# Patient Record
Sex: MALE | Race: WHITE | Employment: FULL TIME | URBAN - METROPOLITAN AREA
[De-identification: names, ages, dates, MRNs, and addresses within clinical notes are randomized per-mention and may not be internally consistent; named-entity substitution may affect disease eponyms.]

---

## 2021-04-08 ENCOUNTER — APPOINTMENT (OUTPATIENT)
Dept: CT IMAGING | Age: 36
DRG: 074 | End: 2021-04-08
Payer: COMMERCIAL

## 2021-04-08 ENCOUNTER — HOSPITAL ENCOUNTER (INPATIENT)
Age: 36
LOS: 1 days | Discharge: HOME | DRG: 074 | End: 2021-04-09
Attending: EMERGENCY MEDICINE | Admitting: INTERNAL MEDICINE
Payer: COMMERCIAL

## 2021-04-08 ENCOUNTER — APPOINTMENT (OUTPATIENT)
Dept: GENERAL RADIOLOGY | Age: 36
DRG: 074 | End: 2021-04-08
Payer: COMMERCIAL

## 2021-04-08 DIAGNOSIS — R29.810 FACIAL WEAKNESS: Primary | ICD-10-CM

## 2021-04-08 PROBLEM — G45.9 TIA (TRANSIENT ISCHEMIC ATTACK): Status: ACTIVE | Noted: 2021-04-08

## 2021-04-08 LAB
ALBUMIN SERPL-MCNC: 5 G/DL (ref 3.5–5.2)
ALP BLD-CCNC: 66 U/L (ref 40–129)
ALT SERPL-CCNC: 153 U/L (ref 0–40)
ANION GAP SERPL CALCULATED.3IONS-SCNC: 11 MMOL/L (ref 7–16)
APTT: 31.8 SEC (ref 24.5–35.1)
AST SERPL-CCNC: 69 U/L (ref 0–39)
BASOPHILS ABSOLUTE: 0.07 E9/L (ref 0–0.2)
BASOPHILS RELATIVE PERCENT: 0.8 % (ref 0–2)
BILIRUB SERPL-MCNC: 0.3 MG/DL (ref 0–1.2)
BUN BLDV-MCNC: 14 MG/DL (ref 6–20)
CALCIUM SERPL-MCNC: 9.9 MG/DL (ref 8.6–10.2)
CHLORIDE BLD-SCNC: 100 MMOL/L (ref 98–107)
CHP ED QC CHECK: YES
CO2: 27 MMOL/L (ref 22–29)
CREAT SERPL-MCNC: 1.2 MG/DL (ref 0.7–1.2)
EOSINOPHILS ABSOLUTE: 0.28 E9/L (ref 0.05–0.5)
EOSINOPHILS RELATIVE PERCENT: 3.2 % (ref 0–6)
GFR AFRICAN AMERICAN: >60
GFR NON-AFRICAN AMERICAN: >60 ML/MIN/1.73
GLUCOSE BLD-MCNC: 101 MG/DL (ref 74–99)
GLUCOSE BLD-MCNC: 99 MG/DL
HCT VFR BLD CALC: 43.4 % (ref 37–54)
HEMOGLOBIN: 15 G/DL (ref 12.5–16.5)
IMMATURE GRANULOCYTES #: 0.02 E9/L
IMMATURE GRANULOCYTES %: 0.2 % (ref 0–5)
INR BLD: 1
LYMPHOCYTES ABSOLUTE: 3.32 E9/L (ref 1.5–4)
LYMPHOCYTES RELATIVE PERCENT: 38 % (ref 20–42)
MCH RBC QN AUTO: 29.8 PG (ref 26–35)
MCHC RBC AUTO-ENTMCNC: 34.6 % (ref 32–34.5)
MCV RBC AUTO: 86.3 FL (ref 80–99.9)
METER GLUCOSE: 99 MG/DL (ref 74–99)
MONOCYTES ABSOLUTE: 0.96 E9/L (ref 0.1–0.95)
MONOCYTES RELATIVE PERCENT: 11 % (ref 2–12)
NEUTROPHILS ABSOLUTE: 4.08 E9/L (ref 1.8–7.3)
NEUTROPHILS RELATIVE PERCENT: 46.8 % (ref 43–80)
PDW BLD-RTO: 12.8 FL (ref 11.5–15)
PLATELET # BLD: 336 E9/L (ref 130–450)
PMV BLD AUTO: 9.2 FL (ref 7–12)
POTASSIUM SERPL-SCNC: 3.6 MMOL/L (ref 3.5–5)
PROTHROMBIN TIME: 11 SEC (ref 9.3–12.4)
RBC # BLD: 5.03 E12/L (ref 3.8–5.8)
SODIUM BLD-SCNC: 138 MMOL/L (ref 132–146)
TOTAL PROTEIN: 8.5 G/DL (ref 6.4–8.3)
TROPONIN: <0.01 NG/ML (ref 0–0.03)
WBC # BLD: 8.7 E9/L (ref 4.5–11.5)

## 2021-04-08 PROCEDURE — 71046 X-RAY EXAM CHEST 2 VIEWS: CPT

## 2021-04-08 PROCEDURE — 6370000000 HC RX 637 (ALT 250 FOR IP): Performed by: EMERGENCY MEDICINE

## 2021-04-08 PROCEDURE — 1200000000 HC SEMI PRIVATE

## 2021-04-08 PROCEDURE — 85610 PROTHROMBIN TIME: CPT

## 2021-04-08 PROCEDURE — 80053 COMPREHEN METABOLIC PANEL: CPT

## 2021-04-08 PROCEDURE — 99285 EMERGENCY DEPT VISIT HI MDM: CPT

## 2021-04-08 PROCEDURE — 84484 ASSAY OF TROPONIN QUANT: CPT

## 2021-04-08 PROCEDURE — 36415 COLL VENOUS BLD VENIPUNCTURE: CPT

## 2021-04-08 PROCEDURE — 93005 ELECTROCARDIOGRAM TRACING: CPT | Performed by: EMERGENCY MEDICINE

## 2021-04-08 PROCEDURE — 85730 THROMBOPLASTIN TIME PARTIAL: CPT

## 2021-04-08 PROCEDURE — 82962 GLUCOSE BLOOD TEST: CPT

## 2021-04-08 PROCEDURE — 70450 CT HEAD/BRAIN W/O DYE: CPT

## 2021-04-08 PROCEDURE — 85025 COMPLETE CBC W/AUTO DIFF WBC: CPT

## 2021-04-08 RX ORDER — ACETAMINOPHEN 500 MG
1000 TABLET ORAL ONCE
Status: COMPLETED | OUTPATIENT
Start: 2021-04-08 | End: 2021-04-08

## 2021-04-08 RX ADMIN — ACETAMINOPHEN 1000 MG: 500 TABLET, FILM COATED ORAL at 21:56

## 2021-04-08 ASSESSMENT — PAIN SCALES - GENERAL: PAINLEVEL_OUTOF10: 1

## 2021-04-09 ENCOUNTER — APPOINTMENT (OUTPATIENT)
Dept: MRI IMAGING | Age: 36
DRG: 074 | End: 2021-04-09
Payer: COMMERCIAL

## 2021-04-09 VITALS
RESPIRATION RATE: 16 BRPM | WEIGHT: 237 LBS | OXYGEN SATURATION: 97 % | TEMPERATURE: 97.7 F | SYSTOLIC BLOOD PRESSURE: 130 MMHG | BODY MASS INDEX: 33.93 KG/M2 | DIASTOLIC BLOOD PRESSURE: 83 MMHG | HEIGHT: 70 IN | HEART RATE: 74 BPM

## 2021-04-09 LAB
EKG ATRIAL RATE: 61 BPM
EKG ATRIAL RATE: 90 BPM
EKG P AXIS: 54 DEGREES
EKG P AXIS: 58 DEGREES
EKG P-R INTERVAL: 162 MS
EKG P-R INTERVAL: 172 MS
EKG Q-T INTERVAL: 348 MS
EKG Q-T INTERVAL: 390 MS
EKG QRS DURATION: 82 MS
EKG QRS DURATION: 92 MS
EKG QTC CALCULATION (BAZETT): 392 MS
EKG QTC CALCULATION (BAZETT): 425 MS
EKG R AXIS: 24 DEGREES
EKG R AXIS: 26 DEGREES
EKG T AXIS: 49 DEGREES
EKG T AXIS: 50 DEGREES
EKG VENTRICULAR RATE: 61 BPM
EKG VENTRICULAR RATE: 90 BPM
LV EF: 60 %
LVEF MODALITY: NORMAL
TROPONIN: <0.01 NG/ML (ref 0–0.03)
TROPONIN: <0.01 NG/ML (ref 0–0.03)

## 2021-04-09 PROCEDURE — 70551 MRI BRAIN STEM W/O DYE: CPT

## 2021-04-09 PROCEDURE — 2580000003 HC RX 258: Performed by: INTERNAL MEDICINE

## 2021-04-09 PROCEDURE — 93306 TTE W/DOPPLER COMPLETE: CPT

## 2021-04-09 PROCEDURE — 93005 ELECTROCARDIOGRAM TRACING: CPT | Performed by: INTERNAL MEDICINE

## 2021-04-09 PROCEDURE — 92523 SPEECH SOUND LANG COMPREHEN: CPT

## 2021-04-09 PROCEDURE — 6370000000 HC RX 637 (ALT 250 FOR IP): Performed by: INTERNAL MEDICINE

## 2021-04-09 PROCEDURE — 36415 COLL VENOUS BLD VENIPUNCTURE: CPT

## 2021-04-09 PROCEDURE — 99221 1ST HOSP IP/OBS SF/LOW 40: CPT | Performed by: PSYCHIATRY & NEUROLOGY

## 2021-04-09 PROCEDURE — 97161 PT EVAL LOW COMPLEX 20 MIN: CPT

## 2021-04-09 PROCEDURE — 97165 OT EVAL LOW COMPLEX 30 MIN: CPT

## 2021-04-09 PROCEDURE — 92610 EVALUATE SWALLOWING FUNCTION: CPT

## 2021-04-09 PROCEDURE — 6360000002 HC RX W HCPCS: Performed by: INTERNAL MEDICINE

## 2021-04-09 PROCEDURE — 84484 ASSAY OF TROPONIN QUANT: CPT

## 2021-04-09 RX ORDER — ONDANSETRON 2 MG/ML
4 INJECTION INTRAMUSCULAR; INTRAVENOUS EVERY 6 HOURS PRN
Status: DISCONTINUED | OUTPATIENT
Start: 2021-04-09 | End: 2021-04-09 | Stop reason: HOSPADM

## 2021-04-09 RX ORDER — ASPIRIN 81 MG/1
81 TABLET, CHEWABLE ORAL DAILY
Status: DISCONTINUED | OUTPATIENT
Start: 2021-04-09 | End: 2021-04-09 | Stop reason: HOSPADM

## 2021-04-09 RX ORDER — ATORVASTATIN CALCIUM 80 MG/1
80 TABLET, FILM COATED ORAL NIGHTLY
Status: DISCONTINUED | OUTPATIENT
Start: 2021-04-09 | End: 2021-04-09 | Stop reason: HOSPADM

## 2021-04-09 RX ORDER — SODIUM CHLORIDE 9 MG/ML
25 INJECTION, SOLUTION INTRAVENOUS PRN
Status: DISCONTINUED | OUTPATIENT
Start: 2021-04-09 | End: 2021-04-09 | Stop reason: HOSPADM

## 2021-04-09 RX ORDER — PROMETHAZINE HYDROCHLORIDE 25 MG/1
12.5 TABLET ORAL EVERY 6 HOURS PRN
Status: DISCONTINUED | OUTPATIENT
Start: 2021-04-09 | End: 2021-04-09 | Stop reason: HOSPADM

## 2021-04-09 RX ORDER — SODIUM CHLORIDE 0.9 % (FLUSH) 0.9 %
5-40 SYRINGE (ML) INJECTION EVERY 12 HOURS SCHEDULED
Status: DISCONTINUED | OUTPATIENT
Start: 2021-04-09 | End: 2021-04-09 | Stop reason: HOSPADM

## 2021-04-09 RX ORDER — PREDNISONE 20 MG/1
60 TABLET ORAL DAILY
Qty: 10 TABLET | Refills: 0 | Status: SHIPPED | OUTPATIENT
Start: 2021-04-09 | End: 2021-04-16

## 2021-04-09 RX ORDER — ASPIRIN 300 MG/1
300 SUPPOSITORY RECTAL DAILY
Status: DISCONTINUED | OUTPATIENT
Start: 2021-04-09 | End: 2021-04-09

## 2021-04-09 RX ORDER — POLYETHYLENE GLYCOL 3350 17 G/17G
17 POWDER, FOR SOLUTION ORAL DAILY PRN
Status: DISCONTINUED | OUTPATIENT
Start: 2021-04-09 | End: 2021-04-09 | Stop reason: HOSPADM

## 2021-04-09 RX ORDER — SODIUM CHLORIDE 0.9 % (FLUSH) 0.9 %
5-40 SYRINGE (ML) INJECTION PRN
Status: DISCONTINUED | OUTPATIENT
Start: 2021-04-09 | End: 2021-04-09 | Stop reason: HOSPADM

## 2021-04-09 RX ORDER — VALACYCLOVIR HYDROCHLORIDE 1 G/1
1000 TABLET, FILM COATED ORAL 3 TIMES DAILY
Qty: 21 TABLET | Refills: 0 | Status: SHIPPED | OUTPATIENT
Start: 2021-04-09 | End: 2021-04-16

## 2021-04-09 RX ADMIN — ASPIRIN 81 MG CHEWABLE TABLET 81 MG: 81 TABLET CHEWABLE at 11:11

## 2021-04-09 RX ADMIN — SODIUM CHLORIDE, PRESERVATIVE FREE 10 ML: 5 INJECTION INTRAVENOUS at 09:28

## 2021-04-09 RX ADMIN — ENOXAPARIN SODIUM 40 MG: 40 INJECTION SUBCUTANEOUS at 09:28

## 2021-04-09 ASSESSMENT — PAIN SCALES - GENERAL
PAINLEVEL_OUTOF10: 0

## 2021-04-09 NOTE — H&P
software. Every effort was made to ensure accuracy; however, inadvertent computerized transcription errors may be present.

## 2021-04-09 NOTE — PROGRESS NOTES
SPEECH/LANGUAGE PATHOLOGY  SPEECH/LANGUAGE/COGNITIVE EVALUATION      PATIENT NAME:  Samantha Osorio      :  1985          TODAY'S DATE:  2021 ROOM:  [address] [place] [address]       ADMITTING DIAGNOSIS: TIA (transient ischemic attack) [G45.9]    SPEECH PATHOLOGY DIAGNOSIS:    Mild dysarthria     THERAPY RECOMMENDATIONS:       Speech Pathology intervention is recommended 3-6 times per week for LOS or when goals are met with emphasis on the following: To Improve oral motor strength and ROM               MOTOR SPEECH       Oral Peripheral Examination   Right labiobuccal weakness    Parameters of Speech Production  Respiration:  Adequate for speech production  Articulation:  Mild Distortion  Resonance:  Within functional limits  Quality:   Within functional limits  Pitch: Within functional limits  Intensity: Within functional limits  Fluency:  Intact  Prosody Intact    RECEPTIVE LANGUAGE    Comprehension of Yes/No Questions: Within functional limits    Process  Simple Verbal Commands:   Within functional limits  Process Intermediate Verbal Commands:   Within functional limits  Process Complex Verbal Commands:     Within functional limits    Comprehension of Conversation:      Within functional limits      EXPRESSIVE LANGUAGE     Serials: Functional    Imitation:  Words   Functional   Sentences Functional    Naming:  (Modality used:  Verbal)  Confrontation Naming  Functional  Functional Description  Functional  Response Naming: Functional    Conversation:      Conversation was within functional limits    COGNITION     Attention/Orientation  Attention: Sustained attention   Orientation:  Oriented to Person, Place, Date, Reason for hospitalization    Memory   Immediate Recall: Repeated 3/3    Delayed Recall:   Recalled 3/3    Long Term Recall:   Recalled Address, Birthdate, Age and Family    Organization/Problem Solving/Reasoning   Verbal Sequencing: To be assessed        Verbal Problem solving:    To be assessed CLINICAL OBSERVATIONS NOTED DURING THE EVALUATION  Within functional limits                  Prognosis for improvements is good  This plan will be re-evaluated and revised in 1 week  if warranted. Patient stated goals: Agreed with above  Treatment goals discussed with Patient   The Patient understand(s) the diagnosis, prognosis and plan of care       CPT code:    68481  eval speech sound lang comprehension        The admitting diagnosis and active problem list, as listed below have been reviewed prior to initiation of this evaluation.         ACTIVE PROBLEM LIST:   Patient Active Problem List   Diagnosis    Infective otitis externa    Cellulitis of head except face    TIA (transient ischemic attack)       Princess Berger   SLP student intern

## 2021-04-09 NOTE — CONSULTS
Georgie Gale is a 30 y.o. male       Chief Complaint   Patient presents with    Facial Droop     HA behind right eye started at 12 pm.  tongue feels like there's something on it.  ++ left mothe droop. c/o both hands being numb       HPI:    73-year-old man with out significant medical history presents with right-sided facial droop. He has had some associated symptoms of abnormal sensation in the tongue and the lips. He reported always mild headache but not to me. He has never had similar symptoms before. No tick bite or arthritis. HPI  Prior to Visit Medications    Not on File     Social History     Tobacco Use    Smoking status: Never Smoker    Smokeless tobacco: Never Used   Substance Use Topics    Alcohol use: No    Drug use: No     No family history on file. Past Surgical History:   Procedure Laterality Date    WISDOM TOOTH EXTRACTION       No past medical history on file. Review of Systems    As stated above all others negative. Objective:   /67   Pulse 68   Temp 96.7 °F (35.9 °C) (Temporal)   Resp 12   Ht 5' 10\" (1.778 m)   Wt 237 lb (107.5 kg)   SpO2 98%   BMI 34.01 kg/m²     Physical Exam  HENT:      Head: Normocephalic and atraumatic. Mouth/Throat:      Mouth: Mucous membranes are moist.      Pharynx: Oropharynx is clear. Eyes:      General: Lids are normal.      Extraocular Movements: Extraocular movements intact. Conjunctiva/sclera: Conjunctivae normal.      Pupils: Pupils are equal, round, and reactive to light. Neck:      Musculoskeletal: Neck supple. Cardiovascular:      Rate and Rhythm: Normal rate and regular rhythm. Abdominal:      General: Bowel sounds are normal.      Palpations: Abdomen is soft. Skin:     General: Skin is warm. Neurological:      Mental Status: He is alert. Deep Tendon Reflexes: Strength normal.      Reflex Scores:       Tricep reflexes are 2+ on the right side and 2+ on the left side.        Bicep reflexes are 2+ on the right side and 2+ on the left side. Brachioradialis reflexes are 2+ on the right side and 2+ on the left side. Patellar reflexes are 2+ on the right side and 2+ on the left side. Achilles reflexes are 2+ on the right side and 2+ on the left side. Psychiatric:         Mood and Affect: Mood normal.         Speech: Speech normal.                 Neurological Exam  Mental Status  Alert. Oriented to person, place, time and situation. Recent and remote memory are intact. Speech is normal. Attention and concentration are normal.    Cranial Nerves  CN II: Visual fields full to confrontation. CN III, IV, VI: Extraocular movements intact bilaterally. Normal lids and orbits bilaterally. Pupils equal round and reactive to light bilaterally. CN V: Facial sensation is normal.  CN VII:  Right: There is central facial weakness. CN VIII: Hearing is normal.  CN IX, X: Palate elevates symmetrically. Normal gag reflex. CN XI: Shoulder shrug strength is normal.  CN XII: Tongue midline without atrophy or fasciculations. Motor  Normal muscle bulk throughout. Normal muscle tone. No abnormal involuntary movements. Strength is 5/5 throughout all four extremities. Sensory  Light touch is normal in upper and lower extremities. Pinprick is normal in upper and lower extremities.      Reflexes                                           Right                      Left  Brachioradialis                    2+                         2+  Biceps                                 2+                         2+  Triceps                                2+                         2+  Finger flex                           2+                         2+  Hamstring                            2+                         2+  Patellar                                2+                         2+  Achilles                                2+                         2+    Coordination  Right: Finger-to-nose normal. Heel-to-shin normal.  Left: Finger-to-nose normal. Heel-to-shin normal.      Laboratory/Radiology:     CBC with Differential:    Lab Results   Component Value Date    WBC 8.7 04/08/2021    RBC 5.03 04/08/2021    HGB 15.0 04/08/2021    HCT 43.4 04/08/2021     04/08/2021    MCV 86.3 04/08/2021    MCH 29.8 04/08/2021    MCHC 34.6 04/08/2021    RDW 12.8 04/08/2021    LYMPHOPCT 38.0 04/08/2021    MONOPCT 11.0 04/08/2021    BASOPCT 0.8 04/08/2021    MONOSABS 0.96 04/08/2021    LYMPHSABS 3.32 04/08/2021    EOSABS 0.28 04/08/2021    BASOSABS 0.07 04/08/2021     CMP:    Lab Results   Component Value Date     04/08/2021    K 3.6 04/08/2021     04/08/2021    CO2 27 04/08/2021    BUN 14 04/08/2021    CREATININE 1.2 04/08/2021    GFRAA >60 04/08/2021    LABGLOM >60 04/08/2021    GLUCOSE 99 04/08/2021    PROT 8.5 04/08/2021    LABALBU 5.0 04/08/2021    CALCIUM 9.9 04/08/2021    BILITOT 0.3 04/08/2021    ALKPHOS 66 04/08/2021    AST 69 04/08/2021     04/08/2021     Warfarin PT/INR:  No components found for: PTPATWAR, PTINRWAR  HgBA1c:  No results found for: LABA1C    MRI brain:  negative    I independently reviewed the labs and imaging studies at today's appointment. Assessment:     [place] palsy. Plan:     Valacyclovir 1000mg TIS for 1 week  Prednisone 60mg daily for 1 week.       Oriana Hernandez  11:33 AM  4/9/2021

## 2021-04-09 NOTE — CARE COORDINATION
Spoke with patient in room. He lives home with 2 children in a 1 floor apartment. He is independent at home and works full time. His PCP is Dr Nayeli Fortune, he states no history of HHC or PAMELA. Discussed transition of care, plan is home, no needs noted at this time.

## 2021-04-09 NOTE — DISCHARGE SUMMARY
Hospital Medicine Discharge Summary    Patient ID: Georgie Gale      Patient's PCP: Ruby Coulter DO    Admit Date: 4/8/2021     Discharge Date:    4/8/2021    Admitting Physician: Todd Davis DO     Discharge Physician: Amy Chávez MD      Condition at discharge: Stable    Disposition: Home    Discharge Diagnoses:  Bell's palsy  Incidental finding of PFO         The patient was seen and examined on day of discharge and this discharge summary is in conjunction with any daily progress note from day of discharge. Hospital Course:   Mr. Georgie Gale, a 30y.o. year old male  who  has no past medical history on file. Patient says that he is relatively healthy and has no chronic medical issues. Patient comes in with complaints of right-sided headache over the past couple of days. Yesterday he also noticed his tongue to be and test was abnormal.  He also noticed some droopiness on the left side of his mouth. CT head did not show any acute findings. Patient had MRI done which did not show any acute findings. Patient was seen by neurology and they thought patient most likely had Bell's palsy. He recommended valacyclovir and prednisone for now. Patient also had echocardiogram done which showed incidental findings of PFO. I saw the patient again and explained the findings to him. I offered him to stay in the hospital and be evaluated by cardiology for his PFO or follow-up as outpatient and patient at this time chose to follow-up with cardiology as outpatient. At this time the clinical significance is unclear given that he did not have any acute CVA. He was recommended to have a close follow-up with cardiology as outpatient. I did refer him to Dr. Quinn Allen. Consults:     IP CONSULT TO INTERNAL MEDICINE  IP CONSULT TO NEUROLOGY    Significant Diagnostic Studies:  As above      Discharge Instructions/Follow-up:  As above       Activity: activity as tolerated    Labs:  For convenience and continuity at follow-up the following most recent labs are provided:      CBC:    Lab Results   Component Value Date    WBC 8.7 04/08/2021    HGB 15.0 04/08/2021    HCT 43.4 04/08/2021     04/08/2021       Renal:    Lab Results   Component Value Date     04/08/2021    K 3.6 04/08/2021     04/08/2021    CO2 27 04/08/2021    BUN 14 04/08/2021    CREATININE 1.2 04/08/2021    CALCIUM 9.9 04/08/2021         Discharge Medications:     Current Discharge Medication List           Details   valACYclovir (VALTREX) 1 g tablet Take 1 tablet by mouth 3 times daily for 7 days  Qty: 21 tablet, Refills: 0      predniSONE (DELTASONE) 20 MG tablet Take 3 tablets by mouth daily for 7 days  Qty: 10 tablet, Refills: 0             Signed:    Loree Baum MD   4/9/2021      Thank you Dago Parker DO for the opportunity to be involved in this patient's care. If you have any questions or concerns please feel free to contact me. NOTE: This report was transcribed using voice recognition software. Every effort was made to ensure accuracy; however, inadvertent computerized transcription errors may be present.

## 2021-04-09 NOTE — PROGRESS NOTES
OCCUPATIONAL THERAPY INITIAL EVALUATION      Date:2021  Patient Name: Samantha Osorio  MRN: [id number]  : 1985  Room: [address] [place] [address]      [address], [facility] [id number]    AM-PAC Daily Activity Raw Score:   Recommended Adaptive Equipment: none     Diagnosis: TIA (transient ischemic attack) [G45.9]     Modified [place] Scale (MRS)  Score     Description  0             No symptoms  1             No significant disability despite symptoms  2             Slight disability; able to look after own affairs  3             Moderate disability; able to ambulate without assist/ requires assist with ADLs  4             Moderate/Severe disability;requires assist to ambulate/assist with ADLs  5             Severe disability;bedridden/incontinent   6               Score:   1    Referring physician: Bao Navarrete DO    Pertinent Medical History: No past medical history on file. Precautions:  NPO     Home Living: Pt lives with 2 sons in [address]st floor [address], 1 handrail   Bathroom setup: tub/shower   Equipment owned: n/a    Prior Level of Function: independent with ADLs , independent with IADLs; ambulated independently w/o AD  Driving: yes  Occupation: food     Pain Level: Pt c/o 1/10 headache this session    Cognition: A&O: /; Follows 1-2 step directions   Memory:  good (good recall)   Sequencing:  good    Problem solving:  good    Judgement/safety:  good      Functional Assessment:   Initial Eval Status  Date: 21 Treatment Status  Date: Short Term Goals/LTG  Treatment frequency: Evaluation Only   Feeding NPO  Once approved for diet,  reinforced importance of safety d/t c/o lip tingling     Grooming Independent      UB Dressing Independent      LB Dressing Modified [place]      Bathing Modified [place]     Toileting Independent      Bed Mobility  Rolling: Independent   Supine to sit:  Independent   Sit to supine: Independent      Functional Transfers Independent

## 2021-04-09 NOTE — ED PROVIDER NOTES
HPI:  4/8/21,   Time: 10:12 PM EDT         Genny Harrison is a 30 y.o. male presenting to the ED for left facial drooping, beginning more than 10 ago. The complaint has been persistent, moderate in severity, and worsened by nothing. Patient noted some numbness of the left side of his mouth actually started yesterday and then today when he brushes teeth and he spit the water out he states it went up to the right. He looked in the mirror and he had significant facial drooping on the left. He also has a headache in the right frontal temporal area. He has good strength in his arms and legs. ROS:   Pertinent positives and negatives are stated within HPI, all other systems reviewed and are negative.  --------------------------------------------- PAST HISTORY ---------------------------------------------  Past Medical History:  has no past medical history on file. Past Surgical History:  has a past surgical history that includes [place] tooth extraction. Social History:  reports that he has never smoked. He has never used smokeless tobacco. He reports that he does not drink alcohol or use drugs. Family History: family history is not on file. The patients home medications have been reviewed. Allergies: Patient has no known allergies.     -------------------------------------------------- RESULTS -------------------------------------------------  All laboratory and radiology results have been personally reviewed by myself   LABS:  Results for orders placed or performed during the hospital encounter of 04/08/21   CBC auto differential   Result Value Ref Range    WBC 8.7 4.5 - 11.5 E9/L    RBC 5.03 3.80 - 5.80 E12/L    Hemoglobin 15.0 12.5 - 16.5 g/dL    Hematocrit 43.4 37.0 - 54.0 %    MCV 86.3 80.0 - 99.9 fL    MCH 29.8 26.0 - 35.0 pg    MCHC 34.6 (H) 32.0 - 34.5 %    RDW 12.8 11.5 - 15.0 fL    Platelets 051 815 - 247 E9/L    MPV 9.2 7.0 - 12.0 fL    Neutrophils % 46.8 43.0 - 80.0 %    Immature Granulocytes % 0.2 0.0 - 5.0 %    Lymphocytes % 38.0 20.0 - 42.0 %    Monocytes % 11.0 2.0 - 12.0 %    Eosinophils % 3.2 0.0 - 6.0 %    Basophils % 0.8 0.0 - 2.0 %    Neutrophils Absolute 4.08 1.80 - 7.30 E9/L    Immature Granulocytes # 0.02 E9/L    Lymphocytes Absolute 3.32 1.50 - 4.00 E9/L    Monocytes Absolute 0.96 (H) 0.10 - 0.95 E9/L    Eosinophils Absolute 0.28 0.05 - 0.50 E9/L    Basophils Absolute 0.07 0.00 - 0.20 E9/L   Comprehensive metabolic panel   Result Value Ref Range    Sodium 138 132 - 146 mmol/L    Potassium 3.6 3.5 - 5.0 mmol/L    Chloride 100 98 - 107 mmol/L    CO2 27 22 - 29 mmol/L    Anion Gap 11 7 - 16 mmol/L    Glucose 101 (H) 74 - 99 mg/dL    BUN 14 6 - 20 mg/dL    CREATININE 1.2 0.7 - 1.2 mg/dL    GFR Non-African American >60 >=60 mL/min/1.73    GFR African American >60     Calcium 9.9 8.6 - 10.2 mg/dL    Total Protein 8.5 (H) 6.4 - 8.3 g/dL    Albumin 5.0 3.5 - 5.2 g/dL    Total Bilirubin 0.3 0.0 - 1.2 mg/dL    Alkaline Phosphatase 66 40 - 129 U/L     (H) 0 - 40 U/L    AST 69 (H) 0 - 39 U/L   Protime-INR   Result Value Ref Range    Protime 11.0 9.3 - 12.4 sec    INR 1.0    APTT   Result Value Ref Range    aPTT 31.8 24.5 - 35.1 sec   Troponin   Result Value Ref Range    Troponin <0.01 0.00 - 0.03 ng/mL   POCT glucose   Result Value Ref Range    Glucose 99 mg/dL    QC OK? yes    POCT Glucose   Result Value Ref Range    Meter Glucose 99 74 - 99 mg/dL       RADIOLOGY:  Interpreted by Radiologist.  XR CHEST (2 VW)   Final Result   No acute process. CT HEAD WO CONTRAST   Final Result   No acute intracranial abnormality.             ------------------------- NURSING NOTES AND VITALS REVIEWED ---------------------------   The nursing notes within the ED encounter and vital signs as below have been reviewed.    /64   Pulse 71   Temp 98.4 °F (36.9 °C) (Oral)   Resp 18   Ht 5' 10\" (1.778 m)   Wt 237 lb (107.5 kg)   SpO2 99%   BMI 34.01 kg/m²   Oxygen Saturation Interpretation: Normal      ---------------------------------------------------PHYSICAL EXAM--------------------------------------      Constitutional/General: Alert and oriented x3, well appearing, non toxic in NAD  Head: NC/AT  Eyes: PERRL, EOMI  Mouth: Oropharynx clear, handling secretions, no trismus  Neck: Supple, full ROM, no meningeal signs  Pulmonary: Lungs clear to auscultation bilaterally, no wheezes, rales, or rhonchi. Not in respiratory distress  Cardiovascular:  Regular rate and rhythm, no murmurs, gallops, or rubs. 2+ distal pulses  Abdomen: Soft, non tender, non distended,   Extremities: Moves all extremities x 4. Warm and well perfused  Skin: warm and dry without rash  Neurologic: GCS 15, has good strength in his arms and legs equal bilaterally. He has some significant left facial drooping involving the corner of his mouth on the left and left lower lip. Extraocular muscles are intact. Upon asked to wrinkle his forehead the forehead looks pretty symmetric which concerns me that this may not be a Bell's palsy. Psych: Normal Affect      ------------------------------ ED COURSE/MEDICAL DECISION MAKING----------------------  Medications   acetaminophen (TYLENOL) tablet 1,000 mg (1,000 mg Oral Given 4/8/21 2984)         Medical Decision Making:    Routine labs are normal.  CT of the head was negative. Consult was placed to sound physicians as patient needs to be admitted and evaluated by neurology and  needs MRI  .[facility]  Spoke to hospitalist at [facility] yarelis, case discussed she will accept the patient     NIH score is 1-2  Counseling: The emergency provider has spoken with the patient and discussed todays results, in addition to providing specific details for the plan of care and counseling regarding the diagnosis and prognosis.   Questions are answered at this time and they are agreeable with the plan.      --------------------------------- IMPRESSION AND DISPOSITION ---------------------------------    IMPRESSION  1.  Facial weakness        DISPOSITION  Disposition: Admit to telemetry  Patient condition is stable                  Isra Childers MD  04/10/21 [id number]

## 2021-04-09 NOTE — PROGRESS NOTES
Pt has bells palsy. He can be discharged with 1 week of steroids and antiviral therapy. Full consult note to follow.

## 2021-04-09 NOTE — PROGRESS NOTES
Physical Therapy  Physical Therapy Initial Assessment     Name: Veronica Tierney  : 1985  MRN: [id number]    Referring Provider: Jeanine Cook DO    Date of Service: 2021    Evaluating PT: Lucia Barlow, PT, DPT, [id number]    Room #: 3493/1201-J  Diagnosis: TIA    SUBJECTIVE:    Pt lives with 2 children (6 and 15 y/o) in a single story apartment unit. Ramp + 3 stairs and 1 rail to enter building. 10 stairs and 2 wide rails to apartment level. Pt ambulated without AD prior to admission. Pt works full time at a [employer] job. OBJECTIVE:   Initial Evaluation  Date: 21 Treatment Date: Short Term/ Long Term   Goals   AM-PAC 6 Clicks      Was pt agreeable to Eval/treatment? Yes     Does pt have pain? Mild posterior HA     Bed Mobility  Rolling: Independent   Supine to sit: Independent   Sit to supine: Independent   Scooting: Independent   NA   Transfers Sit to stand: Independent   Stand to sit: Independent   Stand pivot: Independent   NA   Ambulation   50 feet x2 Independent   NA   Stair negotiation: ascended and descended 4 step(s) with 1 rail(s) Mod Independent   NA   ROM BUE: Refer to OT note  BLE: WFL     Strength BUE: Refer to OT note  BLE: 5/5  NA   Balance Sitting EOB: Independent   Dynamic Standing: Independent   NA     Pt is A & O x: 4 to person, place, month/year, and situation. Sensation: Pt denies numbness and tingling of extremities. B LE light touch sensation intact with examination. Edema: Unremarkable. Patient education  Pt educated on PT role in acute care setting. Patient response to education:   Pt verbalized understanding Pt demonstrated skill Pt requires further education in this area   Yes NA No     ASSESSMENT:    Comments:   Pt was supine in bed with sister present upon room entry, agreeable to PT evaluation. Pt ambulates with normal gait speed with good steadiness. Pt did not have LOB. Pt negotiated stairs without difficulty.  Pt reports he is at baseline and has no PT needs at this time. Pt was left in bed with all needs met at conclusion of session. PLAN:    Based on pt's current level of functional independence for all mobility, this pt is not a candidate for continued skilled PT services. Will remove pt from PT caseload. Please re-consult if pt experiences functional decline. Thank you. Time in: 1000  Time out: 1015    Total Treatment Time 0 minutes     Evaluation Time includes thorough review of current medical information, gathering information on past medical history/social history and prior level of function, completion of standardized testing/informal observation of tasks, assessment of data and education on plan of care and goals.     CPT codes:   [x] Low Complexity PT evaluation 37806  [] Moderate Complexity PT evaluation 20968  [] High Complexity PT evaluation 78886  [] PT Re-evaluation 57323  [] Gait training 83847 0 minutes  [] Manual therapy 48466 0 minutes  [] Therapeutic activities 33099 0 minutes  [] Therapeutic exercises 28615 0 minutes  [] Neuromuscular reeducation 44460 0 minutes     Esdras Patel, PT, DPT  [id number]

## 2021-04-09 NOTE — PROGRESS NOTES
SPEECH/LANGUAGE PATHOLOGY  BEDSIDE SWALLOWING EVALUATION    PATIENT NAME:  Terri Oliver      :  1985          TODAY'S DATE:  2021 ROOM:  [address] [place] [address]      SUMMARY OF EVALUATION     DYSPHAGIA DIAGNOSIS:  Within functional limits      DIET RECOMMENDATIONS: Regular consistency solids with  thin liquids     FEEDING RECOMMENDATIONS:     Assistance level:  no assistance needed      Compensatory strategies recommended:compensatory strategies are not recommended at this time    THERAPY RECOMMENDATIONS:      Dysphagia therapy is not recommended                  PROCEDURE     Consistencies Administered During the Evaluation   Liquids: Thin   Solids:  Pureed, solids      Method of Intake:   Straw, spoon  Self fed, fed by clinician    Position:   Upright seated                  RESULTS     Oral Stage: The oral stage of swallowing was within functional limits      Pharyngeal Stage:      No signs of aspiration were noted during this evaluation however, silent aspiration cannot be ruled out at bedside. If silent aspiration is suspected, a Videofluoroscopic Study of Swallowing (MBS) is recommended and requires a physician order. The Speech Language Pathologist (SLP) completed education with the patient regarding results of evaluation. Explained that Speech Pathology intervention is not warranted at this time      CPT code:  21457  bedside swallow eval      [x]The admitting diagnosis and active problem list, as listed below have been reviewed prior to initiation of this evaluation.      ADMITTING DIAGNOSIS: TIA (transient ischemic attack) [G45.9]     ACTIVE PROBLEM LIST:   Patient Active Problem List   Diagnosis    Infective otitis externa    Cellulitis of head except face    TIA (transient ischemic attack)     Miranda Dial   SLP student intern

## 2021-04-12 ENCOUNTER — TELEPHONE (OUTPATIENT)
Dept: INTERNAL MEDICINE | Age: 36
End: 2021-04-12

## 2021-04-12 NOTE — TELEPHONE ENCOUNTER
Previous patient greater than 10 years ago. Would need to establish as a new patient.  Ok to expedite the visit and put him in an open slot

## 2021-04-12 NOTE — TELEPHONE ENCOUNTER
Pt is on TCM list and has you listed as his PCP. Could not find any encounters with this pt being seen. How would you like to proceed? Would you like for me to call and schedule pt a TCM visit with you or is this not your pt? Please advise. Thank you!

## 2021-04-13 NOTE — TELEPHONE ENCOUNTER
I can only schedule TCM visit's. If they need to be scheduled as a new patient they need to be sent to pre service or scheduled as a new patient in office. Thank you.

## 2021-04-13 NOTE — TELEPHONE ENCOUNTER
I apologize. I thought you were asking from Pre-Service. You can schedule a TCM wherever you find a spot and we will do the new patient visit the following visit. That is fine.

## 2021-04-13 NOTE — TELEPHONE ENCOUNTER
[facility] Initial Follow Up Call    Outreach made within 2 business days of discharge: Yes    Patient: Gutierrez Johnson Patient : 1985   MRN: <[id number]>  Reason for Admission: There are no discharge diagnoses documented for the most recent discharge. Discharge Date: 21         Discharge department/facility: Home    TCM Interactive Patient Contact:  Left pt voicemail to return TCM call at [phone]    Follow Up  No future appointments.     Dennis Emanuel

## 2024-03-01 ENCOUNTER — HOSPITAL ENCOUNTER (OUTPATIENT)
Dept: ULTRASOUND IMAGING | Age: 39
Discharge: HOME | End: 2024-03-01
Attending: FAMILY MEDICINE
Payer: COMMERCIAL

## 2024-03-01 DIAGNOSIS — K76.0 FATTY LIVER: ICD-10-CM

## 2024-03-01 PROCEDURE — 91200 LIVER ELASTOGRAPHY: CPT

## 2024-07-05 ENCOUNTER — HOSPITAL ENCOUNTER (OUTPATIENT)
Age: 39
Discharge: HOME | End: 2024-07-05
Payer: COMMERCIAL

## 2024-07-05 LAB
FSH SERPL-ACNC: 3.3 MIU/ML
HCT VFR BLD AUTO: 44 % (ref 37–54)
LH SERPL-ACNC: 6.7 MIU/ML
PROLACTIN SERPL-MCNC: 15.3 NG/ML
PSA SERPL-MCNC: 0.44 NG/ML (ref 0–4)

## 2024-07-05 PROCEDURE — 84270 ASSAY OF SEX HORMONE GLOBUL: CPT

## 2024-07-05 PROCEDURE — 83001 ASSAY OF GONADOTROPIN (FSH): CPT

## 2024-07-05 PROCEDURE — 83002 ASSAY OF GONADOTROPIN (LH): CPT

## 2024-07-05 PROCEDURE — G0103 PSA SCREENING: HCPCS

## 2024-07-05 PROCEDURE — 84146 ASSAY OF PROLACTIN: CPT

## 2024-07-05 PROCEDURE — 36415 COLL VENOUS BLD VENIPUNCTURE: CPT

## 2024-07-05 PROCEDURE — 85014 HEMATOCRIT: CPT

## 2024-07-05 PROCEDURE — 84403 ASSAY OF TOTAL TESTOSTERONE: CPT

## 2024-07-06 LAB
SHBG SERPL-SCNC: 12 NMOL/L (ref 17–56)
TESTOST FREE MFR SERPL: 71.1 PG/ML (ref 47–244)
TESTOST SERPL-MCNC: 235 NG/DL (ref 249–836)

## 2025-03-29 ENCOUNTER — HOSPITAL ENCOUNTER (OUTPATIENT)
Age: 40
Discharge: HOME | End: 2025-03-29
Payer: COMMERCIAL

## 2025-03-29 LAB
25(OH)D3 SERPL-MCNC: 14 NG/ML (ref 30–100)
ALBUMIN SERPL-MCNC: 4.7 G/DL (ref 3.5–5.2)
ALP SERPL-CCNC: 64 U/L (ref 40–129)
ALT SERPL-CCNC: 142 U/L (ref 0–40)
ANION GAP SERPL CALCULATED.3IONS-SCNC: 13 MMOL/L (ref 7–16)
AST SERPL-CCNC: 92 U/L (ref 0–39)
BILIRUB SERPL-MCNC: 0.4 MG/DL (ref 0–1.2)
BUN SERPL-MCNC: 15 MG/DL (ref 6–20)
CALCIUM SERPL-MCNC: 9.8 MG/DL (ref 8.6–10.2)
CHLORIDE SERPL-SCNC: 101 MMOL/L (ref 98–107)
CHOLEST SERPL-MCNC: 132 MG/DL
CO2 SERPL-SCNC: 26 MMOL/L (ref 22–29)
CREAT SERPL-MCNC: 1.1 MG/DL (ref 0.7–1.2)
GFR, ESTIMATED: >90 ML/MIN/1.73M2
GLUCOSE SERPL-MCNC: 88 MG/DL (ref 74–99)
HDLC SERPL-MCNC: 35 MG/DL
LDLC SERPL CALC-MCNC: 76 MG/DL
POTASSIUM SERPL-SCNC: 4 MMOL/L (ref 3.5–5)
PROT SERPL-MCNC: 8.1 G/DL (ref 6.4–8.3)
SODIUM SERPL-SCNC: 140 MMOL/L (ref 132–146)
TRIGL SERPL-MCNC: 103 MG/DL
VLDLC SERPL CALC-MCNC: 21 MG/DL

## 2025-03-29 PROCEDURE — 80061 LIPID PANEL: CPT

## 2025-03-29 PROCEDURE — 36415 COLL VENOUS BLD VENIPUNCTURE: CPT

## 2025-03-29 PROCEDURE — 80053 COMPREHEN METABOLIC PANEL: CPT

## 2025-03-29 PROCEDURE — 82306 VITAMIN D 25 HYDROXY: CPT

## 2025-05-16 ENCOUNTER — HOSPITAL ENCOUNTER (OUTPATIENT)
Age: 40
Discharge: HOME | End: 2025-05-16
Payer: COMMERCIAL

## 2025-05-16 LAB — HCT VFR BLD AUTO: 42.4 % (ref 37–54)

## 2025-05-16 PROCEDURE — 85014 HEMATOCRIT: CPT

## 2025-05-16 PROCEDURE — 36415 COLL VENOUS BLD VENIPUNCTURE: CPT

## 2025-05-16 PROCEDURE — 84403 ASSAY OF TOTAL TESTOSTERONE: CPT

## 2025-05-16 PROCEDURE — 84270 ASSAY OF SEX HORMONE GLOBUL: CPT

## 2025-05-17 LAB
SHBG SERPL-SCNC: 10 NMOL/L (ref 10–60)
TESTOST FREE MFR SERPL: 130.6 PG/ML (ref 47–244)
TESTOST SERPL-MCNC: 396 NG/DL (ref 249–836)

## 2025-08-30 ENCOUNTER — HOSPITAL ENCOUNTER (OUTPATIENT)
Dept: LAB | Age: 40
Discharge: HOME | End: 2025-08-30
Payer: COMMERCIAL

## 2025-08-30 LAB
ALBUMIN SERPL-MCNC: 4.6 G/DL (ref 3.5–5.2)
ALP SERPL-CCNC: 61 U/L (ref 40–129)
ALT SERPL-CCNC: 114 U/L (ref 0–50)
ANION GAP SERPL CALCULATED.3IONS-SCNC: 12 MMOL/L (ref 7–16)
AST SERPL-CCNC: 61 U/L (ref 0–50)
BASOPHILS # BLD: 0.05 K/UL (ref 0–0.2)
BASOPHILS NFR BLD: 1 % (ref 0–2)
BILIRUB SERPL-MCNC: 0.5 MG/DL (ref 0–1.2)
BUN SERPL-MCNC: 13 MG/DL (ref 6–20)
CALCIUM SERPL-MCNC: 9.7 MG/DL (ref 8.6–10)
CHLORIDE SERPL-SCNC: 103 MMOL/L (ref 98–107)
CHOLEST SERPL-MCNC: 203 MG/DL
CO2 SERPL-SCNC: 26 MMOL/L (ref 22–29)
CREAT SERPL-MCNC: 1 MG/DL (ref 0.7–1.2)
EOSINOPHIL # BLD: 0.24 K/UL (ref 0.05–0.5)
EOSINOPHILS RELATIVE PERCENT: 3 % (ref 0–6)
ERYTHROCYTE [DISTWIDTH] IN BLOOD BY AUTOMATED COUNT: 13.2 % (ref 11.5–15)
FERRITIN SERPL-MCNC: 115 NG/ML
GFR, ESTIMATED: >90 ML/MIN/1.73M2
GLUCOSE SERPL-MCNC: 98 MG/DL (ref 74–99)
HCT VFR BLD AUTO: 45.3 % (ref 37–54)
HDLC SERPL-MCNC: 49 MG/DL
HGB BLD-MCNC: 15.7 G/DL (ref 12.5–16.5)
IGA SERPL-MCNC: 186 MG/DL (ref 70–400)
IGG SERPL-MCNC: 1423 MG/DL (ref 700–1600)
IGM SERPL-MCNC: 120 MG/DL (ref 40–230)
IMM GRANULOCYTES # BLD AUTO: <0.03 K/UL (ref 0–0.58)
IMM GRANULOCYTES NFR BLD: 0 % (ref 0–5)
INR PPP: 1.1
IRON SATN MFR SERPL: 32 % (ref 20–55)
IRON SERPL-MCNC: 105 UG/DL (ref 61–157)
LDLC SERPL CALC-MCNC: 116 MG/DL
LYMPHOCYTES NFR BLD: 2.05 K/UL (ref 1.5–4)
LYMPHOCYTES RELATIVE PERCENT: 28 % (ref 20–42)
MCH RBC QN AUTO: 29.2 PG (ref 26–35)
MCHC RBC AUTO-ENTMCNC: 34.7 G/DL (ref 32–34.5)
MCV RBC AUTO: 84.4 FL (ref 80–99.9)
MONOCYTES NFR BLD: 0.64 K/UL (ref 0.1–0.95)
MONOCYTES NFR BLD: 9 % (ref 2–12)
NEUTROPHILS NFR BLD: 58 % (ref 43–80)
NEUTS SEG NFR BLD: 4.22 K/UL (ref 1.8–7.3)
PLATELET # BLD AUTO: 328 K/UL (ref 130–450)
PMV BLD AUTO: 8.8 FL (ref 7–12)
POTASSIUM SERPL-SCNC: 4 MMOL/L (ref 3.5–5.1)
PROT SERPL-MCNC: 7.8 G/DL (ref 6.4–8.3)
PROTHROMBIN TIME: 11.6 SEC (ref 9.3–12.4)
RBC # BLD AUTO: 5.37 M/UL (ref 3.8–5.8)
SEND OUT REPORT: NORMAL
SEND OUT REPORT: NORMAL
SODIUM SERPL-SCNC: 141 MMOL/L (ref 136–145)
T4 FREE SERPL-MCNC: 0.7 NG/DL (ref 0.9–1.7)
TEST NAME: NORMAL
TEST NAME: NORMAL
TIBC SERPL-MCNC: 333 UG/DL (ref 250–450)
TRIGL SERPL-MCNC: 189 MG/DL
TSH SERPL DL<=0.05 MIU/L-ACNC: 1.06 UIU/ML (ref 0.27–4.2)
VLDLC SERPL CALC-MCNC: 38 MG/DL
WBC OTHER # BLD: 7.2 K/UL (ref 4.5–11.5)

## 2025-08-30 PROCEDURE — 36415 COLL VENOUS BLD VENIPUNCTURE: CPT

## 2025-08-30 PROCEDURE — 82784 ASSAY IGA/IGD/IGG/IGM EACH: CPT

## 2025-08-30 PROCEDURE — 82103 ALPHA-1-ANTITRYPSIN TOTAL: CPT

## 2025-08-30 PROCEDURE — 82728 ASSAY OF FERRITIN: CPT

## 2025-08-30 PROCEDURE — 84439 ASSAY OF FREE THYROXINE: CPT

## 2025-08-30 PROCEDURE — 87340 HEPATITIS B SURFACE AG IA: CPT

## 2025-08-30 PROCEDURE — 85610 PROTHROMBIN TIME: CPT

## 2025-08-30 PROCEDURE — 86255 FLUORESCENT ANTIBODY SCREEN: CPT

## 2025-08-30 PROCEDURE — 80053 COMPREHEN METABOLIC PANEL: CPT

## 2025-08-30 PROCEDURE — 82787 IGG 1 2 3 OR 4 EACH: CPT

## 2025-08-30 PROCEDURE — 85025 COMPLETE CBC W/AUTO DIFF WBC: CPT

## 2025-08-30 PROCEDURE — 83540 ASSAY OF IRON: CPT

## 2025-08-30 PROCEDURE — 86038 ANTINUCLEAR ANTIBODIES: CPT

## 2025-08-30 PROCEDURE — 86708 HEPATITIS A ANTIBODY: CPT

## 2025-08-30 PROCEDURE — 83550 IRON BINDING TEST: CPT

## 2025-08-30 PROCEDURE — 86803 HEPATITIS C AB TEST: CPT

## 2025-08-30 PROCEDURE — 84443 ASSAY THYROID STIM HORMONE: CPT

## 2025-08-30 PROCEDURE — 80061 LIPID PANEL: CPT

## 2025-08-30 PROCEDURE — 86317 IMMUNOASSAY INFECTIOUS AGENT: CPT

## 2025-08-31 LAB
HBV SURFACE AB SERPL IA-ACNC: 9.5 MIU/ML (ref 0–9.99)
HBV SURFACE AG SERPL QL IA: NONREACTIVE
HCV AB SERPL QL IA: NONREACTIVE

## 2025-09-02 LAB
ANA SER QL IA: NEGATIVE
MITOCHONDRIA AB SER QL: NEGATIVE
SMA IGG SER-ACNC: NEGATIVE

## 2025-09-03 LAB
HAV AB SERPL IA-ACNC: REACTIVE
IGG4 SER-MCNC: 131 MG/DL (ref 1–123)

## 2025-09-04 LAB
SEND OUT REPORT: NORMAL
TEST NAME: NORMAL

## 2025-09-05 LAB — A1AT SERPL-MCNC: 87 MG/DL (ref 90–200)
